# Patient Record
Sex: FEMALE | URBAN - METROPOLITAN AREA
[De-identification: names, ages, dates, MRNs, and addresses within clinical notes are randomized per-mention and may not be internally consistent; named-entity substitution may affect disease eponyms.]

---

## 2018-04-19 ENCOUNTER — APPOINTMENT (RX ONLY)
Dept: URBAN - METROPOLITAN AREA CLINIC 23 | Facility: CLINIC | Age: 63
Setting detail: DERMATOLOGY
End: 2018-04-19

## 2018-04-19 DIAGNOSIS — Z41.9 ENCOUNTER FOR PROCEDURE FOR PURPOSES OTHER THAN REMEDYING HEALTH STATE, UNSPECIFIED: ICD-10-CM

## 2018-04-19 PROBLEM — Z85.828 PERSONAL HISTORY OF OTHER MALIGNANT NEOPLASM OF SKIN: Status: ACTIVE | Noted: 2018-04-19

## 2018-04-19 PROCEDURE — ? FILLERS

## 2018-04-19 PROCEDURE — ? DYSPORT

## 2018-04-19 ASSESSMENT — LOCATION SIMPLE DESCRIPTION DERM
LOCATION SIMPLE: LEFT TEMPLE
LOCATION SIMPLE: LEFT CHEEK
LOCATION SIMPLE: GLABELLA
LOCATION SIMPLE: RIGHT TEMPLE
LOCATION SIMPLE: RIGHT CHEEK

## 2018-04-19 ASSESSMENT — LOCATION DETAILED DESCRIPTION DERM
LOCATION DETAILED: RIGHT INFERIOR TEMPLE
LOCATION DETAILED: GLABELLA
LOCATION DETAILED: LEFT INFERIOR TEMPLE
LOCATION DETAILED: RIGHT INFERIOR MEDIAL MALAR CHEEK
LOCATION DETAILED: LEFT INFERIOR MEDIAL MALAR CHEEK

## 2018-04-19 ASSESSMENT — LOCATION ZONE DERM: LOCATION ZONE: FACE

## 2018-04-19 NOTE — PROCEDURE: DYSPORT
[place] Units: 0
Consent: Written consent obtained. Risks include but not limited to lid/brow ptosis, bruising, swelling, diplopia, temporary effect, incomplete chemical denervation.
Additional Area 1 Location: chin
Additional Area 2 Location: high forehead 2 cm above brows
Dilution (U/ 0.1cc): 1.5
Additional Area 6 Location: neck
Additional Area 3 Location: above left lateral brow
Expiration Date (Month Year): 09/30/2018
Additional Area 1 Units: 10
Price (Use Numbers Only, No Special Characters Or $): 0.00
Periorbital Skin Units: [address]
Additional Area 5 Location: neck bands
Detail Level: Zone
Lot #: [id number]
Post-Care Instructions: Patient instructed to not lie down for 4 hours and limit physical activity for 24 hours. Patient instructed not to travel by airplane for 48 hours.

## 2018-04-19 NOTE — PROCEDURE: FILLERS
Brows Filler  Volume In Cc: 0
Post-Care Instructions: Patient instructed to apply ice to reduce swelling.
Additional Area 1 Volume In Cc: 1
Additional Area 1 Location: cheeks and jawline,upper lip fine
Use Map Statement For Sites (Optional): No
Anesthesia Type: 1% lidocaine without epinephrine
Filler: Restylane
Detail Level: Zone
Additional Area 1 Location: perioral fine lines, lateral mouth, marionette lines
Consent: Written consent obtained. Risks include but not limited to bruising, beading, irregular texture, ulceration, infection, allergic reaction, scar formation, incomplete augmentation, temporary nature, procedural pain.
Additional Anesthesia Volume In Cc: 6
Lot #: [id number]
Additional Comments: Physicians samples
Map Statment: See [address] for Complete Details
Lot #: [id number]
Expiration Date (Month Year): 2/2018
Anesthesia Volume In Cc: 0.2
Filler: Restylane Silk
Price (Use Numbers Only, No Special Characters Or $): 0.00
Expiration Date (Month Year): 11/2019

## 2019-10-31 ENCOUNTER — APPOINTMENT (RX ONLY)
Dept: URBAN - METROPOLITAN AREA CLINIC 23 | Facility: CLINIC | Age: 64
Setting detail: DERMATOLOGY
End: 2019-10-31

## 2019-10-31 DIAGNOSIS — Z41.9 ENCOUNTER FOR PROCEDURE FOR PURPOSES OTHER THAN REMEDYING HEALTH STATE, UNSPECIFIED: ICD-10-CM

## 2019-10-31 PROCEDURE — ? FILLERS

## 2019-10-31 PROCEDURE — ? CHEMICAL PEEL

## 2019-10-31 ASSESSMENT — LOCATION DETAILED DESCRIPTION DERM
LOCATION DETAILED: RIGHT LOWER CUTANEOUS LIP
LOCATION DETAILED: LEFT INFERIOR CENTRAL MALAR CHEEK
LOCATION DETAILED: RIGHT INFERIOR CENTRAL MALAR CHEEK
LOCATION DETAILED: LEFT MEDIAL FOREHEAD

## 2019-10-31 ASSESSMENT — LOCATION ZONE DERM
LOCATION ZONE: FACE
LOCATION ZONE: LIP

## 2019-10-31 ASSESSMENT — LOCATION SIMPLE DESCRIPTION DERM
LOCATION SIMPLE: RIGHT LIP
LOCATION SIMPLE: RIGHT CHEEK
LOCATION SIMPLE: LEFT FOREHEAD
LOCATION SIMPLE: LEFT CHEEK

## 2019-10-31 NOTE — PROCEDURE: FILLERS
Additional Area 2 Volume In Cc: 1
Post-Care Instructions: Patient instructed to apply ice to reduce swelling.
Decollete Filler  Volume In Cc: 0
Additional Area 1 Location: lateral mouth, fine lines perioral, marionette lines, lateral cheeks, vermillion lips
Additional Area 3 Location: Glabbellar fine lines, Nasalabial folds, Marionette lines, vermillion lip
Additional Area 4 Location: Perioral
Filler: Restylane-L
Additional Area 2 Location: Nasalabial, Glabellar fine lines- Complimentary
Additional Area 4 Location: cheeks, lateral jawline, medial cheeks fine lines
Detail Level: Zone
Additional Area 5 Location: Cheeks, vermillion lips, marionette fine lines, glabellar fine lines, lateral mouth
Price (Use Numbers Only, No Special Characters Or $): 0.00
Use Map Statement For Sites (Optional): No
Lot #: [id number]
Lot #: [id number]
Expiration Date (Month Year): 2021-3-31
Map Statment: See [address] for Complete Details
Expiration Date (Month Year): 09/2021
Anesthesia Type: 1% lidocaine without epinephrine
Include Cannula Brand?: DermaSculpt
Lot #: [id number]
Include Cannula Size?: 25G
Expiration Date (Month Year): 08/31/2019
Additional Area 1 Location: lateral mouth, perioral fine lines, vermillion lips, marionette lines
Additional Area 1 Location: cheeks
Include Cannula Length?: 1.5 inch
Additional Anesthesia Volume In Cc: 6
Additional Area 2 Location: Lips, oral commissure, glabellar fine fines
Additional Area 2 Location: cheeks, jawline, oral commissure
Consent: Written consent obtained. Risks include but not limited to bruising, beading, irregular texture, ulceration, infection, allergic reaction, scar formation, incomplete augmentation, temporary nature, procedural pain.
Lot #: [id number]
Additional Area 2 Location: cheeks, jawline, oral commissure bunny lines
Additional Area 3 Location: marionettes,nasal labial folds fine lines around the chin
Filler: Restylane Silk
Lot #: [id number]
Lot #: [id number]
Map Statment: See Attach Map for Complete Details
Expiration Date (Month Year): 2021-7-31

## 2019-10-31 NOTE — PROCEDURE: CHEMICAL PEEL
Chemical Peel For Dermal Peels: glycolic 42%
Consent: Prior to the procedure, written consent was obtained and risks were reviewed, including but not limited to: redness, peeling, blistering, pigmentary change, scarring, infection, and pain.
Detail Level: Detailed
Post-Care Instructions: I reviewed with the patient in detail post-care instructions. Patient should avoid sun exposure and wear sun protection.
Time (Mins): 3
Total Number Of Aks Treated: 0
Treatment Number: 1

## 2020-12-22 ENCOUNTER — APPOINTMENT (RX ONLY)
Dept: URBAN - METROPOLITAN AREA CLINIC 23 | Facility: CLINIC | Age: 65
Setting detail: DERMATOLOGY
End: 2020-12-22

## 2020-12-22 VITALS — TEMPERATURE: 97.7 F

## 2020-12-22 DIAGNOSIS — Z41.9 ENCOUNTER FOR PROCEDURE FOR PURPOSES OTHER THAN REMEDYING HEALTH STATE, UNSPECIFIED: ICD-10-CM

## 2020-12-22 PROCEDURE — ? FILLERS

## 2020-12-22 NOTE — PROCEDURE: FILLERS
Lateral Face Filler  Volume In Cc: 0
Additional Area 2 Location: Lips, oral commissure, glabellar fine fines
Include Cannula Brand?: DermaSculpt
Include Cannula Information In Note?: No
Filler: Restylane-L
Detail Level: Zone
Additional Area 3 Location: Glabbellar fine lines, Nasalabial folds, Marionette lines, vermillion lip
Additional Area 4 Location: lateral jawline, lateral mouth, glabella lines,
Price (Use Numbers Only, No Special Characters Or $): 0.00
Additional Area 1 Location: lateral mouth, fine lines perioral, marionette lines, lateral cheeks, vermillion lips
Additional Area 4 Location: Perioral
Additional Area 5 Location: Cheeks, vermillion lips, marionette fine lines, glabellar fine lines, lateral mouth
Additional Area 2 Location: Nasalabial, Glabellar fine lines- Complimentary
Consent: Written consent obtained. Risks include but not limited to bruising, beading, irregular texture, ulceration, infection, allergic reaction, scar formation, incomplete augmentation, temporary nature, procedural pain.
Map Statment: See [address] for Complete Details
Lot #: [id number]
Post-Care Instructions: Patient instructed to apply ice to reduce swelling.
Lot #: [id number]
Expiration Date (Month Year): 2022-4-30
Anesthesia Type: 1% lidocaine without epinephrine
Include Cannula Size?: 25G
Lot #: [id number]
Additional Anesthesia Volume In Cc: 6
Additional Area 1 Location: lateral mouth, vermillion borders
Expiration Date (Month Year): 09/2021
Include Cannula Length?: 1.5 inch
Additional Area 1 Volume In Cc: 1
Expiration Date (Month Year): 08/31/2019
Additional Area 1 Location: lateral mouth, perioral fine lines, vermillion lips, marionette lines
Additional Area 2 Location: cheeks, jawline, oral commissure